# Patient Record
Sex: FEMALE | Race: BLACK OR AFRICAN AMERICAN | Employment: FULL TIME | URBAN - METROPOLITAN AREA
[De-identification: names, ages, dates, MRNs, and addresses within clinical notes are randomized per-mention and may not be internally consistent; named-entity substitution may affect disease eponyms.]

---

## 2018-06-21 ENCOUNTER — OFFICE VISIT (OUTPATIENT)
Dept: SLEEP MEDICINE | Age: 58
End: 2018-06-21

## 2018-06-21 VITALS
TEMPERATURE: 98.2 F | OXYGEN SATURATION: 98 % | BODY MASS INDEX: 28.85 KG/M2 | DIASTOLIC BLOOD PRESSURE: 62 MMHG | RESPIRATION RATE: 18 BRPM | WEIGHT: 194.8 LBS | SYSTOLIC BLOOD PRESSURE: 104 MMHG | HEIGHT: 69 IN | HEART RATE: 62 BPM

## 2018-06-21 DIAGNOSIS — G47.33 OSA (OBSTRUCTIVE SLEEP APNEA): Primary | ICD-10-CM

## 2018-06-21 PROCEDURE — 99204 OFFICE O/P NEW MOD 45 MIN: CPT | Performed by: PSYCHIATRY & NEUROLOGY

## 2018-06-21 RX ORDER — AMLODIPINE BESYLATE 5 MG/1
5 TABLET ORAL
COMMUNITY
Start: 2018-03-01

## 2018-06-21 RX ORDER — NAPROXEN 500 MG/1
TABLET ORAL
COMMUNITY
Start: 2017-07-21

## 2018-06-21 ASSESSMENT — ENCOUNTER SYMPTOMS
SORE THROAT: 1
GASTROINTESTINAL NEGATIVE: 1
ALLERGIC/IMMUNOLOGIC NEGATIVE: 1
APNEA: 1
CHOKING: 1
EYES NEGATIVE: 1

## 2018-06-21 ASSESSMENT — SLEEP AND FATIGUE QUESTIONNAIRES
NECK CIRCUMFERENCE (INCHES): 14
HOW LIKELY ARE YOU TO NOD OFF OR FALL ASLEEP WHILE WATCHING TV: 3
ESS TOTAL SCORE: 15
HOW LIKELY ARE YOU TO NOD OFF OR FALL ASLEEP WHEN YOU ARE A PASSENGER IN A CAR FOR AN HOUR WITHOUT A BREAK: 3
HOW LIKELY ARE YOU TO NOD OFF OR FALL ASLEEP WHILE SITTING AND TALKING TO SOMEONE: 0
HOW LIKELY ARE YOU TO NOD OFF OR FALL ASLEEP IN A CAR, WHILE STOPPED FOR A FEW MINUTES IN TRAFFIC: 2
HOW LIKELY ARE YOU TO NOD OFF OR FALL ASLEEP WHILE SITTING QUIETLY AFTER LUNCH WITHOUT ALCOHOL: 1
HOW LIKELY ARE YOU TO NOD OFF OR FALL ASLEEP WHILE SITTING INACTIVE IN A PUBLIC PLACE: 2
HOW LIKELY ARE YOU TO NOD OFF OR FALL ASLEEP WHILE SITTING AND READING: 2
HOW LIKELY ARE YOU TO NOD OFF OR FALL ASLEEP WHILE LYING DOWN TO REST IN THE AFTERNOON WHEN CIRCUMSTANCES PERMIT: 2

## 2018-07-20 ENCOUNTER — HOSPITAL ENCOUNTER (OUTPATIENT)
Dept: SLEEP MEDICINE | Age: 58
Discharge: HOME | End: 2018-07-21
Attending: PSYCHIATRY & NEUROLOGY | Admitting: PSYCHIATRY & NEUROLOGY

## 2018-07-20 DIAGNOSIS — G47.33 OSA (OBSTRUCTIVE SLEEP APNEA): ICD-10-CM

## 2018-07-20 PROCEDURE — 95806 SLEEP STUDY UNATT&RESP EFFT: CPT | Performed by: PSYCHIATRY & NEUROLOGY

## 2018-07-25 ENCOUNTER — TELEPHONE (OUTPATIENT)
Dept: PULMONOLOGY | Age: 58
End: 2018-07-25

## 2018-07-26 ENCOUNTER — OFFICE VISIT (OUTPATIENT)
Dept: SLEEP MEDICINE | Age: 58
End: 2018-07-26

## 2018-07-26 VITALS
BODY MASS INDEX: 27.43 KG/M2 | HEART RATE: 79 BPM | OXYGEN SATURATION: 98 % | DIASTOLIC BLOOD PRESSURE: 78 MMHG | SYSTOLIC BLOOD PRESSURE: 132 MMHG | RESPIRATION RATE: 18 BRPM | HEIGHT: 70 IN | WEIGHT: 191.6 LBS | TEMPERATURE: 98.1 F

## 2018-07-26 DIAGNOSIS — R06.89 GASPING FOR BREATH: ICD-10-CM

## 2018-07-26 DIAGNOSIS — G47.19 EXCESSIVE DAYTIME SLEEPINESS: ICD-10-CM

## 2018-07-26 DIAGNOSIS — R06.83 SNORING: Primary | ICD-10-CM

## 2018-07-26 PROCEDURE — 99213 OFFICE O/P EST LOW 20 MIN: CPT | Performed by: PSYCHIATRY & NEUROLOGY

## 2018-07-26 NOTE — PROGRESS NOTES
MD PINKY Goff Board Certified in Sleep Medicine  Certified in [facility] Certified in Neurology Kaitlyn Sotolene Stubbs [address] [place] [address] [facility] [address] [place] [address] SLEEP MEDICINE [place]    Subjective:     Patient ID: Valentino Kast is a 60 y.o. female. Chief Complaint   Patient presents with    Follow-up     discuss testing       HPI:        Valentino Kast is a 60 y.o. female was seen today as a follow for EDS/lous snoring The patient underwent home sleep testing on 07/20/2018, the overnight registration revealed negative and inconclusive result with apnea hypopnea index of 0.8/h with lowest O2 saturation of 90%, patient spent about 0 minutes below 90%. .The Patient scored 15 on [place] Sleepiness Scale ( more than 10 is indicative of daytime sleepiness) , still has loud snoring with frequent arousals. DOT/CDL - N/A        Previous Report(s) Reviewed: historical medical records         Social History     Social History    Marital status: Single     Spouse name: N/A    Number of children: N/A    Years of education: N/A     Occupational History    Not on file. Social History Main Topics    Smoking status: Former Smoker     Quit date: 6/21/2008    Smokeless tobacco: Never Used    Alcohol use Not on file    Drug use: Unknown    Sexual activity: Not on file     Other Topics Concern    Not on file     Social History Narrative    No narrative on file       Prior to Admission medications    Medication Sig Start Date End Date Taking?  Authorizing Provider   amLODIPine (NORVASC) 5 MG tablet Take 5 mg by mouth 3/1/18  Yes Historical Provider, MD   naproxen (NAPROSYN) 500 MG tablet TAKE 1 TABLET TWICE A DAY AS NEEDED WITH FOOD 7/21/17  Yes Historical Provider, MD

## 2018-10-02 ENCOUNTER — HOSPITAL ENCOUNTER (OUTPATIENT)
Dept: SLEEP CENTER | Age: 58
Discharge: HOME | End: 2018-10-02
Payer: COMMERCIAL

## 2018-10-02 DIAGNOSIS — R06.83 SNORING: ICD-10-CM

## 2018-10-02 DIAGNOSIS — R06.89 GASPING FOR BREATH: ICD-10-CM

## 2018-10-02 DIAGNOSIS — G47.19 EXCESSIVE DAYTIME SLEEPINESS: ICD-10-CM

## 2018-10-02 PROCEDURE — 95810 POLYSOM 6/> YRS 4/> PARAM: CPT

## 2018-10-03 PROCEDURE — 95810 POLYSOM 6/> YRS 4/> PARAM: CPT | Performed by: PSYCHIATRY & NEUROLOGY

## 2018-10-11 ENCOUNTER — TELEPHONE (OUTPATIENT)
Dept: PULMONOLOGY | Age: 58
End: 2018-10-11

## 2018-10-13 DIAGNOSIS — G47.33 OSA (OBSTRUCTIVE SLEEP APNEA): Primary | ICD-10-CM

## 2018-10-15 ENCOUNTER — OFFICE VISIT (OUTPATIENT)
Dept: SLEEP MEDICINE | Age: 58
End: 2018-10-15
Payer: COMMERCIAL

## 2018-10-15 VITALS
SYSTOLIC BLOOD PRESSURE: 122 MMHG | TEMPERATURE: 98.4 F | WEIGHT: 186.4 LBS | RESPIRATION RATE: 18 BRPM | HEIGHT: 69 IN | HEART RATE: 73 BPM | OXYGEN SATURATION: 95 % | BODY MASS INDEX: 27.61 KG/M2 | DIASTOLIC BLOOD PRESSURE: 68 MMHG

## 2018-10-15 DIAGNOSIS — R06.83 PRIMARY SNORING: Primary | ICD-10-CM

## 2018-10-15 PROCEDURE — 99212 OFFICE O/P EST SF 10 MIN: CPT | Performed by: PSYCHIATRY & NEUROLOGY

## 2018-10-17 ENCOUNTER — TELEPHONE (OUTPATIENT)
Dept: PULMONOLOGY | Age: 58
End: 2018-10-17

## 2019-03-11 ENCOUNTER — OFFICE VISIT (OUTPATIENT)
Dept: SLEEP MEDICINE | Age: 59
End: 2019-03-11
Payer: COMMERCIAL

## 2019-03-11 VITALS
DIASTOLIC BLOOD PRESSURE: 80 MMHG | WEIGHT: 192 LBS | HEIGHT: 69 IN | RESPIRATION RATE: 16 BRPM | OXYGEN SATURATION: 100 % | TEMPERATURE: 97.4 F | SYSTOLIC BLOOD PRESSURE: 142 MMHG | BODY MASS INDEX: 28.44 KG/M2 | HEART RATE: 70 BPM

## 2019-03-11 DIAGNOSIS — R06.83 PRIMARY SNORING: Primary | ICD-10-CM

## 2019-03-11 PROCEDURE — 99212 OFFICE O/P EST SF 10 MIN: CPT | Performed by: PSYCHIATRY & NEUROLOGY

## 2019-03-11 ASSESSMENT — ENCOUNTER SYMPTOMS
ALLERGIC/IMMUNOLOGIC NEGATIVE: 1
GASTROINTESTINAL NEGATIVE: 1
APNEA: 0